# Patient Record
Sex: MALE | Race: WHITE | NOT HISPANIC OR LATINO | URBAN - METROPOLITAN AREA
[De-identification: names, ages, dates, MRNs, and addresses within clinical notes are randomized per-mention and may not be internally consistent; named-entity substitution may affect disease eponyms.]

---

## 2017-07-14 ENCOUNTER — OFFICE VISIT - RIVER FALLS (OUTPATIENT)
Dept: FAMILY MEDICINE | Facility: CLINIC | Age: 27
End: 2017-07-14

## 2018-03-20 ENCOUNTER — AMBULATORY - RIVER FALLS (OUTPATIENT)
Dept: FAMILY MEDICINE | Facility: CLINIC | Age: 28
End: 2018-03-20

## 2019-09-17 ENCOUNTER — AMBULATORY - RIVER FALLS (OUTPATIENT)
Dept: FAMILY MEDICINE | Facility: CLINIC | Age: 29
End: 2019-09-17

## 2020-01-23 ENCOUNTER — OFFICE VISIT - RIVER FALLS (OUTPATIENT)
Dept: FAMILY MEDICINE | Facility: CLINIC | Age: 30
End: 2020-01-23

## 2020-01-23 ASSESSMENT — MIFFLIN-ST. JEOR: SCORE: 2394.61

## 2020-06-24 ENCOUNTER — AMBULATORY - RIVER FALLS (OUTPATIENT)
Dept: FAMILY MEDICINE | Facility: CLINIC | Age: 30
End: 2020-06-24

## 2020-07-06 ENCOUNTER — OFFICE VISIT - RIVER FALLS (OUTPATIENT)
Dept: FAMILY MEDICINE | Facility: CLINIC | Age: 30
End: 2020-07-06

## 2020-07-06 ENCOUNTER — COMMUNICATION - RIVER FALLS (OUTPATIENT)
Dept: FAMILY MEDICINE | Facility: CLINIC | Age: 30
End: 2020-07-06

## 2020-07-06 LAB — DEPRECATED S PYO AG THROAT QL EIA: NOT DETECTED

## 2020-07-06 ASSESSMENT — MIFFLIN-ST. JEOR: SCORE: 2349.25

## 2020-12-02 ENCOUNTER — OFFICE VISIT - RIVER FALLS (OUTPATIENT)
Dept: FAMILY MEDICINE | Facility: CLINIC | Age: 30
End: 2020-12-02

## 2020-12-02 ASSESSMENT — MIFFLIN-ST. JEOR: SCORE: 2385.54

## 2021-04-25 ENCOUNTER — OFFICE VISIT - RIVER FALLS (OUTPATIENT)
Dept: FAMILY MEDICINE | Facility: CLINIC | Age: 31
End: 2021-04-25

## 2021-04-25 ASSESSMENT — MIFFLIN-ST. JEOR: SCORE: 2373.29

## 2021-10-13 ENCOUNTER — OFFICE VISIT - RIVER FALLS (OUTPATIENT)
Dept: FAMILY MEDICINE | Facility: CLINIC | Age: 31
End: 2021-10-13

## 2021-10-13 ASSESSMENT — MIFFLIN-ST. JEOR: SCORE: 2385.54

## 2021-10-15 LAB — D DIMER PPP FEU-MCNC: <0.19 MCG/ML FEU

## 2022-02-11 VITALS
SYSTOLIC BLOOD PRESSURE: 134 MMHG | WEIGHT: 302 LBS | TEMPERATURE: 98.7 F | HEIGHT: 74 IN | HEART RATE: 72 BPM | RESPIRATION RATE: 20 BRPM | OXYGEN SATURATION: 95 % | BODY MASS INDEX: 38.76 KG/M2 | DIASTOLIC BLOOD PRESSURE: 72 MMHG

## 2022-02-11 VITALS
WEIGHT: 315 LBS | HEART RATE: 84 BPM | TEMPERATURE: 97.4 F | SYSTOLIC BLOOD PRESSURE: 140 MMHG | DIASTOLIC BLOOD PRESSURE: 92 MMHG

## 2022-02-11 VITALS
DIASTOLIC BLOOD PRESSURE: 80 MMHG | RESPIRATION RATE: 16 BRPM | TEMPERATURE: 98.1 F | HEIGHT: 74 IN | SYSTOLIC BLOOD PRESSURE: 142 MMHG | WEIGHT: 300 LBS | OXYGEN SATURATION: 98 % | HEART RATE: 80 BPM | BODY MASS INDEX: 38.5 KG/M2

## 2022-02-11 VITALS
TEMPERATURE: 97.6 F | HEART RATE: 64 BPM | HEIGHT: 74 IN | BODY MASS INDEX: 38.5 KG/M2 | SYSTOLIC BLOOD PRESSURE: 124 MMHG | DIASTOLIC BLOOD PRESSURE: 84 MMHG | WEIGHT: 300 LBS | RESPIRATION RATE: 16 BRPM

## 2022-02-11 VITALS
TEMPERATURE: 97.5 F | HEART RATE: 99 BPM | BODY MASS INDEX: 38.15 KG/M2 | HEIGHT: 74 IN | SYSTOLIC BLOOD PRESSURE: 122 MMHG | WEIGHT: 297.3 LBS | DIASTOLIC BLOOD PRESSURE: 81 MMHG

## 2022-02-11 VITALS
SYSTOLIC BLOOD PRESSURE: 154 MMHG | WEIGHT: 292 LBS | HEART RATE: 84 BPM | TEMPERATURE: 98.6 F | HEIGHT: 74 IN | BODY MASS INDEX: 37.47 KG/M2 | DIASTOLIC BLOOD PRESSURE: 82 MMHG

## 2022-02-16 NOTE — NURSING NOTE
"Comprehensive Intake Entered On:  12/2/2020 5:58 PM CST    Performed On:  12/2/2020 5:51 PM CST by Pelon Huntley CMA               Summary   Chief Complaint :   Pt here for rash on RIght hip/\"beltline\" x 3-4 days.   Weight Measured :   300 lb(Converted to: 300 lb 0 oz, 136.078 kg)    Height Measured :   74 in(Converted to: 6 ft 2 in, 187.96 cm)    Body Mass Index :   38.51 kg/m2 (HI)    Body Surface Area :   2.66 m2   Systolic Blood Pressure :   124 mmHg   Diastolic Blood Pressure :   84 mmHg (HI)    Mean Arterial Pressure :   97 mmHg   Peripheral Pulse Rate :   64 bpm   Vital Signs Comments :   Pulse Irregular   BP Site :   Right arm   Pulse Site :   Radial artery   Temperature Tympanic :   97.6 DegF(Converted to: 36.4 DegC)  (LOW)    Respiratory Rate :   16 br/min   Languages :   English   Pelon Huntley CMA - 12/2/2020 5:51 PM CST   Health Status   Allergies Verified? :   No   Medication History Verified? :   Yes   Immunizations Current :   Yes   Medical History Verified? :   Yes   Pre-Visit Planning Status :   Not completed   Tobacco Use? :   Never smoker   Pelon Huntley CMA - 12/2/2020 5:51 PM CST   Meds / Allergies   (As Of: 12/2/2020 5:58:31 PM CST)   Allergies (Active)   Cortisporin  Estimated Onset Date:   Unspecified ; Created By:   Kaela Juarez CMA; Reaction Status:   Active ; Category:   Drug ; Substance:   Cortisporin ; Type:   Allergy ; Updated By:   Kaela Juarez CMA; Source:   Paper Chart ; Reviewed Date:   12/2/2020 5:55 PM CST      sulfa drug  Estimated Onset Date:   Unspecified ; Created By:   Kaela Juarez CMA; Reaction Status:   Active ; Category:   Drug ; Substance:   sulfa drug ; Type:   Allergy ; Updated By:   Kaela Juarez CMA; Source:   Paper Chart ; Reviewed Date:   12/2/2020 5:55 PM CST        Medication List   (As Of: 12/2/2020 5:58:31 PM CST)   Prescription/Discharge Order    albuterol  :   albuterol ; Status:   Prescribed ; Ordered As Mnemonic:   " albuterol 90 mcg/inh inhalation aerosol ; Simple Display Line:   1 puff(s), inh, q4-6 hrs, PRN: as needed for wheezing, 1 EA, 8 Refill(s) ; Ordering Provider:   Roe Spaulding PA-C; Catalog Code:   albuterol ; Order Dt/Tm:   1/23/2020 7:38:24 PM CST          fluticasone-salmeterol  :   fluticasone-salmeterol ; Status:   Prescribed ; Ordered As Mnemonic:   Advair Diskus 100 mcg-50 mcg inhalation powder ; Simple Display Line:   1 puff(s), INH, BID, 1 EA, 6 Refill(s) ; Ordering Provider:   Roe Spaulding PA-C; Catalog Code:   fluticasone-salmeterol ; Order Dt/Tm:   1/23/2020 7:37:58 PM CST          fexofenadine-pseudoephedrine  :   fexofenadine-pseudoephedrine ; Status:   Prescribed ; Ordered As Mnemonic:   Allegra-D 12 Hour 60 mg-120 mg oral tablet, extended release ; Simple Display Line:   1 tab(s), po, q12 hrs, 60 tab(s) ; Ordering Provider:   Brent Booker MD; Catalog Code:   fexofenadine-pseudoephedrine ; Order Dt/Tm:   6/5/2015 10:01:46 AM CDT            Home Meds    fluticasone nasal  :   fluticasone nasal ; Status:   Documented ; Ordered As Mnemonic:   Flonase 50 mcg/inh nasal spray ; Simple Display Line:   1 spray(s), nasal, daily ; Catalog Code:   fluticasone nasal ; Order Dt/Tm:   6/5/2015 9:43:53 AM CDT            ID Risk Screen   Recent Travel History :   No recent travel   Family Member Travel History :   No recent travel   Other Exposure to Infectious Disease :   Unknown   Pelon Huntley CMA - 12/2/2020 5:51 PM CST   Social History   Social History   (As Of: 12/2/2020 5:58:31 PM CST)   Tobacco:        Never (less than 100 in lifetime), chews daily   (Last Updated: 12/2/2020 5:56:08 PM CST by Pelon Huntley CMA)          Electronic Cigarette/Vaping:        Electronic Cigarette Use: Never.   (Last Updated: 12/2/2020 5:51:19 PM CST by Pelon Huntley CMA)          Substance Abuse:  Denies Substance Abuse      (Last Updated: 3/4/2013 6:45:10 PM CST by Jairo Mir MD )

## 2022-02-16 NOTE — NURSING NOTE
Comprehensive Intake Entered On:  10/13/2021 5:30 PM CDT    Performed On:  10/13/2021 5:24 PM CDT by Pelon Huntley CMA               Summary   Chief Complaint :   Pt here for chest pain that radiates into left shoulder and neck with SOB x 3 weeks.   Weight Measured :   300 lb(Converted to: 300 lb 0 oz, 136.078 kg)    Height Measured :   74 in(Converted to: 6 ft 2 in, 187.96 cm)    Body Mass Index :   38.51 kg/m2 (HI)    Body Surface Area :   2.66 m2   Systolic Blood Pressure :   142 mmHg (HI)    Diastolic Blood Pressure :   80 mmHg   Mean Arterial Pressure :   101 mmHg   Peripheral Pulse Rate :   80 bpm   BP Site :   Right arm   Pulse Site :   Radial artery   Temperature Tympanic :   98.1 DegF(Converted to: 36.7 DegC)    Respiratory Rate :   16 br/min   Oxygen Saturation :   98 %   Languages :   English   Pelon Huntley CMA - 10/13/2021 5:24 PM CDT   Health Status   Allergies Verified? :   Yes   Medication History Verified? :   Yes   Immunizations Current :   Yes   Medical History Verified? :   Yes   Pre-Visit Planning Status :   Not completed   Tobacco Use? :   Never smoker   Pelon Huntley CMA - 10/13/2021 5:24 PM CDT   Meds / Allergies   (As Of: 10/13/2021 5:30:50 PM CDT)   Allergies (Active)   Cortisporin  Estimated Onset Date:   Unspecified ; Created By:   Kaela Juarez CMA; Reaction Status:   Active ; Category:   Drug ; Substance:   Cortisporin ; Type:   Allergy ; Updated By:   Kaela Juarez CMA; Source:   Paper Chart ; Reviewed Date:   10/13/2021 5:30 PM CDT      sulfa drug  Estimated Onset Date:   Unspecified ; Created By:   Kaela Juarez CMA; Reaction Status:   Active ; Category:   Drug ; Substance:   sulfa drug ; Type:   Allergy ; Updated By:   Kaela Juarez CMA; Source:   Paper Chart ; Reviewed Date:   10/13/2021 5:30 PM CDT        Medication List   (As Of: 10/13/2021 5:30:50 PM CDT)   Prescription/Discharge Order    lidocaine topical  :   lidocaine topical ; Status:    Processing ; Ordered As Mnemonic:   lidocaine 5% topical film ; Ordering Provider:   Roe Spaulding PA-C; Action Display:   Complete ; Catalog Code:   lidocaine topical ; Order Dt/Tm:   10/13/2021 5:28:15 PM CDT          albuterol  :   albuterol ; Status:   Prescribed ; Ordered As Mnemonic:   albuterol 90 mcg/inh inhalation aerosol ; Simple Display Line:   1 puff(s), inh, q4-6 hrs, PRN: as needed for wheezing, 1 EA, 8 Refill(s) ; Ordering Provider:   Roe Spaulding PA-C; Catalog Code:   albuterol ; Order Dt/Tm:   1/23/2020 7:38:24 PM CST          fexofenadine-pseudoephedrine  :   fexofenadine-pseudoephedrine ; Status:   Prescribed ; Ordered As Mnemonic:   Allegra-D 12 Hour 60 mg-120 mg oral tablet, extended release ; Simple Display Line:   1 tab(s), po, q12 hrs, 60 tab(s) ; Ordering Provider:   Brent Booker MD; Catalog Code:   fexofenadine-pseudoephedrine ; Order Dt/Tm:   6/5/2015 10:01:46 AM CDT            Home Meds    fluticasone nasal  :   fluticasone nasal ; Status:   Documented ; Ordered As Mnemonic:   Flonase 50 mcg/inh nasal spray ; Simple Display Line:   1 spray(s), nasal, daily ; Catalog Code:   fluticasone nasal ; Order Dt/Tm:   6/5/2015 9:43:53 AM CDT            Social History   Social History   (As Of: 10/13/2021 5:30:50 PM CDT)   Tobacco:        Never (less than 100 in lifetime), chews daily   (Last Updated: 12/2/2020 5:56:08 PM CST by Pelon Huntley CMA)          Electronic Cigarette/Vaping:        Electronic Cigarette Use: Never.   (Last Updated: 12/2/2020 5:51:19 PM CST by Pelon Huntley CMA)          Substance Abuse:  Denies Substance Abuse      (Last Updated: 3/4/2013 6:45:10 PM CST by Jairo Mir MD )

## 2022-02-16 NOTE — NURSING NOTE
Comprehensive Intake Entered On:  7/6/2020 9:44 AM CDT    Performed On:  7/6/2020 9:40 AM CDT by Shira Herron CMA               Summary   Chief Complaint :   Concerns with swollen uvula since yesterday. ST as well. No other sx.    Weight Measured :   292 lb(Converted to: 292 lb 0 oz, 132.45 kg)    Height Measured :   74 in(Converted to: 6 ft 2 in, 187.96 cm)    Body Mass Index :   37.49 kg/m2 (HI)    Body Surface Area :   2.63 m2   Systolic Blood Pressure :   154 mmHg (HI)    Diastolic Blood Pressure :   82 mmHg (HI)    Mean Arterial Pressure :   106 mmHg   Peripheral Pulse Rate :   84 bpm   BP Site :   Right arm   Pulse Site :   Radial artery   BP Method :   Manual   HR Method :   Manual   Temperature Tympanic :   98.6 DegF(Converted to: 37.0 DegC)    Languages :   English   Shira Herron CMA - 7/6/2020 9:40 AM CDT   Health Status   Allergies Verified? :   Yes   Medication History Verified? :   Yes   Immunizations Current :   Yes   Pre-Visit Planning Status :   Not completed   Tobacco Use? :   Current every day smoker   Shira Herron CMA - 7/6/2020 9:40 AM CDT   Meds / Allergies   (As Of: 7/6/2020 9:44:57 AM CDT)   Allergies (Active)   Cortisporin  Estimated Onset Date:   Unspecified ; Created By:   Kaela Juarez CMA; Reaction Status:   Active ; Category:   Drug ; Substance:   Cortisporin ; Type:   Allergy ; Updated By:   Kaela Juarez CMA; Source:   Paper Chart ; Reviewed Date:   1/23/2020 7:29 PM CST      sulfa drug  Estimated Onset Date:   Unspecified ; Created By:   Kaela Juarez CMA; Reaction Status:   Active ; Category:   Drug ; Substance:   sulfa drug ; Type:   Allergy ; Updated By:   Kaela Juarez CMA; Source:   Paper Chart ; Reviewed Date:   1/23/2020 7:29 PM CST        Medication List   (As Of: 7/6/2020 9:44:57 AM CDT)   Prescription/Discharge Order    albuterol  :   albuterol ; Status:   Prescribed ; Ordered As Mnemonic:   albuterol 90 mcg/inh inhalation aerosol ;  Simple Display Line:   1 puff(s), inh, q4-6 hrs, PRN: as needed for wheezing, 1 EA, 8 Refill(s) ; Ordering Provider:   Roe Spaulding PA-C; Catalog Code:   albuterol ; Order Dt/Tm:   1/23/2020 7:38:24 PM CST          fexofenadine-pseudoephedrine  :   fexofenadine-pseudoephedrine ; Status:   Prescribed ; Ordered As Mnemonic:   Allegra-D 12 Hour 60 mg-120 mg oral tablet, extended release ; Simple Display Line:   1 tab(s), po, q12 hrs, 60 tab(s) ; Ordering Provider:   Brent Booker MD; Catalog Code:   fexofenadine-pseudoephedrine ; Order Dt/Tm:   6/5/2015 10:01:46 AM CDT          fluticasone-salmeterol  :   fluticasone-salmeterol ; Status:   Prescribed ; Ordered As Mnemonic:   Advair Diskus 100 mcg-50 mcg inhalation powder ; Simple Display Line:   1 puff(s), INH, BID, 1 EA, 6 Refill(s) ; Ordering Provider:   Roe Spaulding PA-C; Catalog Code:   fluticasone-salmeterol ; Order Dt/Tm:   1/23/2020 7:37:58 PM CST            Home Meds    fluticasone nasal  :   fluticasone nasal ; Status:   Documented ; Ordered As Mnemonic:   Flonase 50 mcg/inh nasal spray ; Simple Display Line:   1 spray(s), nasal, daily ; Catalog Code:   fluticasone nasal ; Order Dt/Tm:   6/5/2015 9:43:53 AM CDT            ID Risk Screen   Recent Travel History :   No recent travel   Family Member Travel History :   No recent travel   Other Exposure to Infectious Disease :   Unknown   Shira Herron CMA - 7/6/2020 9:40 AM CDT   Social History   Social History   (As Of: 7/6/2020 9:44:57 AM CDT)   Tobacco:        chews daily   (Last Updated: 7/6/2020 9:42:19 AM CDT by Shira Herron CMA)          Substance Abuse:  Denies Substance Abuse      (Last Updated: 3/4/2013 6:45:10 PM CST by Jairo Mir MD )

## 2022-02-16 NOTE — NURSING NOTE
Comprehensive Intake Entered On:  1/23/2020 7:27 PM CST    Performed On:  1/23/2020 7:22 PM CST by Pelon Huntley CMA               Summary   Chief Complaint :   Pt here for body aches,dry cough,sore throat and chills that started yesterday.   Weight Measured :   302 lb(Converted to: 302 lb 0 oz, 136.98 kg)    Height Measured :   74 in(Converted to: 6 ft 2 in, 187.96 cm)    Body Mass Index :   38.77 kg/m2 (HI)    Body Surface Area :   2.67 m2   Systolic Blood Pressure :   134 mmHg (HI)    Diastolic Blood Pressure :   72 mmHg   Mean Arterial Pressure :   93 mmHg   Peripheral Pulse Rate :   72 bpm   BP Site :   Right arm   Pulse Site :   Radial artery   Temperature Tympanic :   98.7 DegF(Converted to: 37.1 DegC)    Respiratory Rate :   20 br/min   Oxygen Saturation :   95 %   Languages :   English   Pelon Huntley CMA - 1/23/2020 7:22 PM CST   Health Status   Allergies Verified? :   Yes   Medication History Verified? :   Yes   Immunizations Current :   Yes   Medical History Verified? :   Yes   Pre-Visit Planning Status :   Not completed   Tobacco Use? :   Never smoker   Pelon Huntley CMA - 1/23/2020 7:22 PM CST   Meds / Allergies   (As Of: 1/23/2020 7:27:37 PM CST)   Allergies (Active)   Cortisporin  Estimated Onset Date:   Unspecified ; Created By:   Kaela Mueller; Reaction Status:   Active ; Category:   Drug ; Substance:   Cortisporin ; Type:   Allergy ; Updated By:   Kaela Mueller; Source:   Paper Chart ; Reviewed Date:   1/23/2020 7:25 PM CST      sulfa drug  Estimated Onset Date:   Unspecified ; Created By:   Kaela Mueller; Reaction Status:   Active ; Category:   Drug ; Substance:   sulfa drug ; Type:   Allergy ; Updated By:   Kaela Mueller; Source:   Paper Chart ; Reviewed Date:   1/23/2020 7:25 PM CST        Medication List   (As Of: 1/23/2020 7:27:37 PM CST)   Prescription/Discharge Order    ofloxacin ophthalmic  :   ofloxacin ophthalmic ; Status:   Processing ; Ordered As  Mnemonic:   ofloxacin 0.3% ophthalmic solution ; Ordering Provider:   Sherly Purcell; Action Display:   Complete ; Catalog Code:   ofloxacin ophthalmic ; Order Dt/Tm:   1/23/2020 7:22:43 PM CST          olopatadine ophthalmic  :   olopatadine ophthalmic ; Status:   Processing ; Ordered As Mnemonic:   Patanol 0.1% ophthalmic solution ; Ordering Provider:   Sherly Purcell; Action Display:   Complete ; Catalog Code:   olopatadine ophthalmic ; Order Dt/Tm:   1/23/2020 7:25:08 PM CST          fluticasone-salmeterol  :   fluticasone-salmeterol ; Status:   Prescribed ; Ordered As Mnemonic:   Advair Diskus 100 mcg-50 mcg inhalation powder ; Simple Display Line:   1 puff(s), INH, BID, 28 EA ; Ordering Provider:   Dayan Anton MD; Catalog Code:   fluticasone-salmeterol ; Order Dt/Tm:   7/15/2015 5:45:11 PM CDT          albuterol  :   albuterol ; Status:   Prescribed ; Ordered As Mnemonic:   albuterol CFC free 90 mcg/inh inhalation aerosol ; Simple Display Line:   1 puff(s), inh, q4-6 hrs, 1 EA, PRN: as needed for wheezing ; Ordering Provider:   Dayan Anton MD; Catalog Code:   albuterol ; Order Dt/Tm:   7/15/2015 5:43:53 PM CDT          fexofenadine-pseudoephedrine  :   fexofenadine-pseudoephedrine ; Status:   Prescribed ; Ordered As Mnemonic:   Allegra-D 12 Hour 60 mg-120 mg oral tablet, extended release ; Simple Display Line:   1 tab(s), po, q12 hrs, 60 tab(s) ; Ordering Provider:   Brent Booker MD; Catalog Code:   fexofenadine-pseudoephedrine ; Order Dt/Tm:   6/5/2015 10:01:46 AM CDT            Home Meds    fluticasone nasal  :   fluticasone nasal ; Status:   Documented ; Ordered As Mnemonic:   Flonase 50 mcg/inh nasal spray ; Simple Display Line:   1 spray(s), nasal, daily ; Catalog Code:   fluticasone nasal ; Order Dt/Tm:   6/5/2015 9:43:53 AM CDT            Procedures / Surgeries        -    Procedure History   (As Of: 1/23/2020 7:27:37 PM CST)     Procedure Dt/Tm:   8/17/1992 ; Anesthesia Minutes:   0 ;  Procedure Name:   Myringotomy and insertion of T tube ; Procedure Minutes:   0            Procedure Dt/Tm:   3/25/1998 ; Provider:   Shadi Hess MD; Anesthesia Minutes:   0 ; Procedure Name:   Tonsillectomy and adenoidectomy ; Procedure Minutes:   0            Procedure Dt/Tm:   3/25/1998 ; Provider:   Shadi Hess MD; Anesthesia Minutes:   0 ; Procedure Name:   Myringotomy and insertion of tympanic ventilation tube ; Procedure Minutes:   0            Social History   Social History   (As Of: 1/23/2020 7:27:37 PM CST)   Tobacco:  Denies Tobacco Use      (Last Updated: 4/26/2010 11:21:50 AM CDT by Kaela Juarez CMA )         Substance Abuse:  Denies Substance Abuse      (Last Updated: 3/4/2013 6:45:10 PM CST by Jairo Mir MD )
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
## 2022-02-16 NOTE — PROGRESS NOTES
Patient:   RONNA MATA            MRN: [id number]            FIN: [id number]               Age:   27 years     Sex:  Male     :  1990   Associated Diagnoses:   Allergic conjunctivitis   Author:   Sherly Purcell      Chief Complaint   2017 4:36 PM CDT    Pt c/o bilateral eye irritation and discharge on/off since Monday.        History of Present Illness   PPC with bialteral eye irritation, worse on right than on left, present for 5d, no visual changes, feels like today he ahs ahd mucoid discharge consistently from right eye, has known seasonal allergies but does not take flonase or claritin D consistently, does not wear contact lenses      Review of Systems   Constitutional:  Negative.    Eye:       Discharge: Bilateral.    Ear/Nose/Mouth/Throat:  Negative.    Respiratory:  Cough.    Cardiovascular:  Negative.    Gastrointestinal:  Negative.    Immunologic:  Negative.    Musculoskeletal:  Negative.    Integumentary:  Negative.    Neurologic:  Negative.    Psychiatric:  Negative.             Health Status   Allergies:    Allergic Reactions (Selected)  Severity Not Documented  Cortisporin (No reactions were documented)  Sulfa drug (No reactions were documented)   Medications:  (Selected)   Prescriptions  Prescribed  Advair Diskus 100 mcg-50 mcg inhalation powder: 1 puff(s), INH, BID, # 28 EA, 0 Refill(s), Type: Maintenance, Pharmacy: [facility], 1 puff(s) inh bid  Allegra-D 12 Hour 60 mg-120 mg oral tablet, extended release: 1 tab(s), po, q12 hrs, # 60 tab(s), 3 Refill(s), Type: Maintenance  Patanol 0.1% ophthalmic solution: 2 drop(s), Both eyes, BID, # 5 mL, 0 Refill(s), Type: Maintenance, Pharmacy: [facility], 2 drop(s) both eyes bid,x10 day(s)  albuterol CFC free 90 mcg/inh inhalation aerosol: 1 puff(s), inh, q4-6 hrs, PRN: as needed for wheezing, # 1 EA, 0 Refill(s), Type: Maintenance, Pharmacy: [facility], 1 puff(s) inh q4-6 hrs,PRN:as needed for wheezing  ofloxacin 0.3% ophthalmic solution:  2 drop(s), Both eyes, QID, # 5 mL, 0 Refill(s), Type: Maintenance, Pharmacy: [facility], 2 drop(s) both eyes qid,x5 day(s)  Documented Medications  Documented  Flonase 50 mcg/inh nasal spray: 1 spray(s), nasal, daily, 0 Refill(s), Type: Maintenance      Histories   Family History:    No family history items have been selected or recorded.      Physical Examination   Vital Signs   7/14/2017 4:36 PM CDT Temperature Tympanic 97.4 DegF  LOW    Peripheral Pulse Rate 84 bpm    Pulse Site Radial artery    HR Method Manual    Systolic Blood Pressure 140 mmHg    Diastolic Blood Pressure 92 mmHg  HI    Mean Arterial Pressure 108 mmHg    BP Site Right arm    BP Method Manual      Measurements from flowsheet : Measurements   7/14/2017 4:36 PM CDT    Weight Measured - Standard                343 lb     General:  Alert and oriented, No acute distress.    Eye:  Pupils are equal, round and reactive to light, Extraocular movements are intact, Vision unchanged.         Visual acuity: Both eyes, 20/20.         Pupil: Both eyes, Within normal limits.         Eyelids: Both eyes, Upper, Lower, Within normal limits.         Conjunctiva: Right, Injected, Erythematous.         Conjunctiva: Left, Erythematous.         Sclera: Erythematous, Injected episclera.    HENT:  Normocephalic, Normal hearing, Oral mucosa is moist, No sinus tenderness.    Neck:  Supple, Non-tender.    Respiratory:  Lungs are clear to auscultation, Respirations are non-labored.    Cardiovascular:  Normal rate, Regular rhythm.    Integumentary:  Warm, Dry, Pink.    Neurologic:  Alert, Oriented, Normal sensory.    Psychiatric:  Cooperative, Appropriate mood & affect.       Impression and Plan   Diagnosis     Allergic conjunctivitis (DMU23-ZD H10.10).     Patient Instructions:          Limitations: Avoid allergic substances.         Counseled: Patient, Regarding diagnosis, Regarding treatment, Regarding medications, Activity, Verbalized understanding.    Summary:  1.   avoid allergens if possible, cool washcloth to eyes to help if itching or swollen  2.  use claritin and flonase consistently for optimal benefit  3.  can consider prednisone burst if symptoms are not improving (pt declined)  4.  can consider adding in 150mg zantac to help with better control if continues with allergies this seaon, also can consider singulair  5.  can use patanol gtts but if there is drying of the eyes I would like him to stop this  6.  will provide ofloxacin gtt to use if muco-purulent discharge increases but at this time it appears to be allergy.

## 2022-02-16 NOTE — NURSING NOTE
"Comprehensive Intake Entered On:  4/25/2021 10:06 AM CDT    Performed On:  4/25/2021 10:04 AM CDT by Emily Gramajo               Summary   Chief Complaint :   \"swollen uvula\"   Weight Measured :   297.3 lb(Converted to: 297 lb 5 oz, 134.853 kg)    Height Measured :   74 in(Converted to: 6 ft 2 in, 187.96 cm)    Body Mass Index :   38.17 kg/m2 (HI)    Body Surface Area :   2.65 m2   Systolic Blood Pressure :   122 mmHg   Diastolic Blood Pressure :   81 mmHg (HI)    Mean Arterial Pressure :   95 mmHg   Peripheral Pulse Rate :   99 bpm   BP Method :   Electronic   HR Method :   Electronic   Temperature Tympanic :   97.5 DegF(Converted to: 36.4 DegC)  (LOW)    Languages :   English   Emily Gramajo - 4/25/2021 10:04 AM CDT   Health Status   Allergies Verified? :   Yes   Medication History Verified? :   Yes   Immunizations Current :   Yes   Tobacco Use? :   Never smoker   Emily Gramajo - 4/25/2021 10:04 AM CDT   ID Risk Screen   Recent Travel History :   No recent travel   Family Member Travel History :   No recent travel   Other Exposure to Infectious Disease :   Unknown   COVID-19 Testing Status :   No positive COVID-19 test   Emily Gramajo - 4/25/2021 10:04 AM CDT   Social History   Social History   (As Of: 4/25/2021 10:06:45 AM CDT)   Tobacco:        Never (less than 100 in lifetime), chews daily   (Last Updated: 12/2/2020 5:56:08 PM CST by Pelon Huntley CMA)          Electronic Cigarette/Vaping:        Electronic Cigarette Use: Never.   (Last Updated: 12/2/2020 5:51:19 PM CST by Pelon Huntley CMA)          Substance Abuse:  Denies Substance Abuse      (Last Updated: 3/4/2013 6:45:10 PM CST by Jairo Mir MD )  "

## 2022-02-16 NOTE — TELEPHONE ENCOUNTER
Patient Information     Name:RONNA MATA      Address:      [address] [place] [id number]     Sex:Male     YOB: 1990     [phone]     MRN:[id number]     FIN:[id number]     Location:[facility]     Date of Service:10/13/2021      Primary Care Physician:       NONE ,       Attending Physician:       Luisana YATES, Alice SHORE, [phone]   detailed message left on identified voice mail re: thoracic spine xray: no acute process
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
## 2022-02-16 NOTE — TELEPHONE ENCOUNTER
---------------------  From: Shira Herron CMA   Sent: 7/10/2020 8:27:15 AM CDT  Subject: strep cx results     Pt notified via phone call of negative strep cx results.
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
## 2022-02-16 NOTE — PROGRESS NOTES
Patient:   RONNA MATA            MRN: [id number]            FIN: [id number]               Age:   31 years     Sex:  Male     :  1990   Associated Diagnoses:   None   Author:   Carol CROSS, Malick      Procedure   EKG procedure   Indication: chest pain.     Position: supine.     EKG findings   Interpretation: by primary care provider.     Rhythm: sinus.     Axis: normal axis, normal configuration.     Within normal limits.     Intervals: GA normal, QRS normal, QT normal.     Normal EKG.     P waves: normal.     QRS complex: normal, no Q waves present.     ST-T-U complex: normal.     Interpretation: normal EKG, no ST-T wave abnormalities.     Discussed: with patient.        Impression and Plan   Orders

## 2022-02-16 NOTE — NURSING NOTE
Pt given a GI Cocktail per [facility] orders  Pt given 15 cc's viscous lidocaine 2%  exp-2/2023  lot# [id number] [facility]    and 15 cc's of Advanced Antacid  exp-8/22  lot# [id number]  [facility]Jessa Huntley CMA

## 2022-02-16 NOTE — PROGRESS NOTES
Chief Complaint    Concerns with swollen uvula since yesterday. ST as well. No other sx.  History of Present Illness      Patient is a 30-year-old male who is concerned about a swollen uvula.  He noticed it yesterday morning and it is giving him a gagging sensation.  He denies any new foods.  No fevers.       He is allergic to pollen in the last few weeks has had some sinus drainage and a mild sore throat from that.       Status post tonsils and adenoid removal in the past.       He did kiss a new partner 2 days ago before the symptoms started.       Again no fever.  No shortness of breath.  No diarrhea.  No muscle aches.       He chews tobacco.  Review of Systems      Negative except as listed in HPI.  Physical Exam   Vitals & Measurements    T: 98.6   F (Tympanic)  HR: 84(Peripheral)  BP: 154/82     HT: 74 in  WT: 292 lb  BMI: 37.49       Vitals noted and within normal limits except for elevated blood pressure.      Patient is alert, oriented and in no acute distress.      Eyes: conjunctiva not injected.      Ears: canals patent, TMs intact, no erythema and no bulging      Mouth: mucous membranes pink and moist, pharynx not erythematous      Uvula is erythematous and mildly swollen with ulcerations present.  A few petechiae on the soft palate.  No other mouth sores noted.      Neck is supple with no lymphadenopathy      Heart: regular rate and rhythm with no murmur      Lungs: clear to auscultation bilaterally      RST negative  Assessment/Plan       Swollen uvula (R22.1)         strep negative, culture pending        swollen uvula likely viral in origin.        tylenol and ibuprofen prn        fluids        should be completely better iin 7-10 days, if not then follow up        follow up sooner as needed        encouraged to quit chewing tobacco         Ordered:          POC, GROUP A STREP* ([facility]), Specimen Type: Swab, Collection Date: 07/06/20 9:54:00 CDT           Patient Information     Name:ADOLFO RONNA S       Address:      [address] [place] [id number]     Sex:Male     YOB: 1990     [phone]     MRN:[id number]     FIN:[id number]     Location:[facility]     Date of Service:07/06/2020      Primary Care Physician:       NONE ,       Attending Physician:       Indy Contreras MD, [phone]  Problem List/Past Medical History    Ongoing     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     ADHD (Attention Deficit Hyperactivity Disorder)     Asthma     Chicken Pox     Obese    Historical     No qualifying data  Procedure/Surgical History     Myringotomy and insertion of tympanic ventilation tube (03/25/1998)     Tonsillectomy and adenoidectomy (03/25/1998)     Myringotomy and insertion of T tube (08/17/1992)  Medications    Advair Diskus 100 mcg-50 mcg inhalation powder, 1 puff(s), Inhale, bid, 6 refills    albuterol 90 mcg/inh inhalation aerosol, 1 puff(s), Inhale, q4-6 hrs, PRN, 8 refills    Allegra-D 12 Hour 60 mg-120 mg oral tablet, extended release, 1 tab(s), Oral, q12 hrs, 3 refills    Flonase 50 mcg/inh nasal spray, 1 spray(s), Nasal, daily  Allergies    Cortisporin    sulfa drug  Social History    Smoking Status - 07/06/2020     Current every day smoker     Substance Abuse - Denies Substance Abuse, 03/04/2013     Tobacco      chews daily, 07/06/2020  Immunizations      Vaccine Date Status          tetanus/diphth/pertuss (Tdap) adult/adol 09/02/2014 Given          influenza virus vaccine, inactivated 01/14/2013 Given              Comments : [1/14/2013] Flu shot given at [facility]          tetanus/diphth/pertuss (Tdap) adult/adol 04/03/2012 Given          meningococcal conjugate vaccine 04/14/2009 Recorded          Td 07/22/2005 Recorded          Hep B 12/26/1997 Recorded          Hep B 07/23/1997 Recorded          Hep B 06/23/1997 Recorded          OPV 05/06/1996 Recorded          DTaP 05/06/1996 Recorded          MMR  (measles/mumps/rubella) 05/06/1996 Recorded          OPV 05/21/1992 Recorded          DTaP 05/21/1992 Recorded          MMR (measles/mumps/rubella) 05/21/1992 Recorded          Hib (HbOC) 05/21/1992 Recorded          DTaP 1991 Recorded          Hib (HbOC) 1991 Recorded          OPV 1991 Recorded          DTaP 1991 Recorded          Hib (HbOC) 1991 Recorded          OPV 1991 Recorded          DTaP 1991 Recorded          Hib (HbOC) 1991 Recorded  Lab Results       Lab Results (Last 4 results within 90 days)        Group A Strep POC: NOT DETECTED (07/06/20 10:01:00)

## 2023-07-11 ENCOUNTER — NURSE TRIAGE (OUTPATIENT)
Dept: NURSING | Facility: CLINIC | Age: 33
End: 2023-07-11

## 2023-07-11 NOTE — TELEPHONE ENCOUNTER
Eugenio calling concerned with moderate sweating all day, tingling/numbness on the left side of his face also started today and a rash the size of a credit card on both lower leg, denies itching, denies pain. He also feels short of breath. Care advice is to call 911. Eugenio refused states he will go to urgent care tomorrow. Educated him on symptoms of stroke but he refused to seek medical help today.  Kourtney Conrad RN on 7/11/2023 at 6:57 PM        Reason for Disposition    Difficulty breathing    [1] Numbness (i.e., loss of sensation) of the face, arm / hand, or leg / foot on one side of the body AND [2] sudden onset AND [3] present now    Additional Information    Negative: Shock suspected (e.g., cold/pale/clammy skin, too weak to stand, low BP, rapid pulse)    Negative: Sounds like a life-threatening emergency to the triager    Negative: [1] SEVERE weakness (i.e., unable to walk or barely able to walk, requires support) AND [2] new-onset or worsening    Negative: [1] Weakness (i.e., paralysis, loss of muscle strength) of the face, arm / hand, or leg / foot on one side of the body AND [2] sudden onset AND [3] present now (Exception: Bell's palsy suspected [i.e., weakness only on one side of the face, developing over hours to days, no other symptoms])    Negative: [1] Life-threatening reaction (anaphylaxis) in the past to similar substance (e.g., food, insect bite/sting, chemical, etc.) AND [2] < 2 hours since exposure    Negative: Difficulty breathing or wheezing    Negative: [1] Difficulty swallowing or slurred speech AND [2] sudden onset    Negative: Sounds like a life-threatening emergency to the triager    Negative: Patient sounds very sick or weak to the triager    Negative: [1] MODERATE-SEVERE widespread itching (i.e., interferes with sleep, normal activities or school) AND [2] not improved after 24 hours of itching Care Advice    Negative: [1] MODERATE-SEVERE widespread itching (i.e., interferes with sleep,  normal activities or school) AND [2] pregnant    Negative: Taking prescription medication that could cause itching (e.g., codeine/morphine/other opiates, aspirin)    Negative: [1] Hand or foot itching AND [2] pregnant    Negative: [1] Widespread itching AND [2] cause unknown AND [3] present > 48 hours (Exception: caller knows the cause and can eliminate it)    Negative: [1] MILD widespread itching AND [2] pregnant    Negative: Itching is a chronic symptom (recurrent or ongoing AND present > 4 weeks)    Negative: Itching from pollen exposure (e.g., after rolling in grass)    Negative: Itching from low humidity (dry air, especially in wintertime)    Negative: Itching from bubble baths or other soaps    Negative: Itching from chlorine in swimming pool    Negative: [1] Itching of unknown cause AND [2] present < 48 hours    Protocols used: EPDLVSGJ-C-BO, NEUROLOGIC DEFICIT-A-AH, ITCHING - WIDESPREAD-A-AH